# Patient Record
Sex: FEMALE | Race: BLACK OR AFRICAN AMERICAN | NOT HISPANIC OR LATINO | Employment: FULL TIME | URBAN - METROPOLITAN AREA
[De-identification: names, ages, dates, MRNs, and addresses within clinical notes are randomized per-mention and may not be internally consistent; named-entity substitution may affect disease eponyms.]

---

## 2022-11-29 ENCOUNTER — HOSPITAL ENCOUNTER (EMERGENCY)
Facility: CLINIC | Age: 22
Discharge: HOME | End: 2022-11-29
Attending: EMERGENCY MEDICINE | Admitting: EMERGENCY MEDICINE

## 2022-11-29 VITALS
OXYGEN SATURATION: 100 % | SYSTOLIC BLOOD PRESSURE: 123 MMHG | BODY MASS INDEX: 23.9 KG/M2 | WEIGHT: 140 LBS | DIASTOLIC BLOOD PRESSURE: 78 MMHG | RESPIRATION RATE: 18 BRPM | HEIGHT: 64 IN | TEMPERATURE: 98 F | HEART RATE: 93 BPM

## 2022-11-29 DIAGNOSIS — H66.91 RIGHT ACUTE OTITIS MEDIA: ICD-10-CM

## 2022-11-29 PROCEDURE — 99283 EMERGENCY DEPT VISIT LOW MDM: CPT

## 2022-11-29 RX ORDER — AMOXICILLIN 500 MG/1
500 CAPSULE ORAL 3 TIMES DAILY
Qty: 21 CAPSULE | Refills: 0 | Status: SHIPPED | OUTPATIENT
Start: 2022-11-29 | End: 2022-12-06

## 2022-11-29 ASSESSMENT — ENCOUNTER SYMPTOMS
COUGH: 0
CONFUSION: 0
HEADACHES: 0
RHINORRHEA: 0
FEVER: 0
SORE THROAT: 0
EYE PAIN: 0
SINUS PAIN: 0
ADENOPATHY: 0
LIGHT-HEADEDNESS: 0
TROUBLE SWALLOWING: 0
FACIAL SWELLING: 0
FACIAL ASYMMETRY: 0

## 2022-11-30 NOTE — ED PROVIDER NOTES
EMERGENCY DEPARTMENT ENCOUNTER      NAME: Tigre Mondragon  AGE: 22 year old female  YOB: 2000  MRN: [id number]  EVALUATION DATE & TIME: No admission date for patient encounter.    PCP: System, Provider Not In    ED PROVIDER: Kevin Oswald MD      Chief Complaint   Patient presents with     Ear Fullness         FINAL IMPRESSION:  1. Right acute otitis media          ED COURSE & MEDICAL DECISION MAKING:    Pertinent Labs & Imaging studies reviewed. (See chart for details)  22 year old female presents to the Emergency Department for evaluation of right ear discomfort    Differential includes otitis media, eustachian tube dysfunction, cerumen impaction, referred pain    ED Course as of 11/29/22 2250 Tue Nov 29, 2022 2141 Chart review patient was registered at the [facility] earlier today   2249 On exam has some right tympanic membrane redness no bulging.  Otherwise ear exam is normal   2250 Plan for flight tomorrow.  Recommend Sudafed as well as amoxicillin for developing ear infection   2250 Mastoiditis, Melinda Llanos, stroke unlikely     10:29 PM I met with the patient and performed my initial interview and exam and discussed plan for discharge. Patient seen in [facility] due to critical capacity leaving no ED beds available.      At the conclusion of the encounter I discussed the results of all of the tests and the disposition. The questions were answered. The patient or family acknowledged understanding and was agreeable with the care plan.     Medical Decision Making    History:    Supplemental history from: N/A    External Record(s) reviewed: Documented in HPI, if applicable.    Work Up:    Chart documentation includes differential considered and any EKGs or imaging interpreted by provider.    In additional to work up documented, I considered the following work up: See chart documentation, if applicable.    External consultation:    Discussion of management with another provider: See chart  "documentation, if applicable    Complicating factors:    Care impacted by chronic illness: None    Care affected by social determinants of health: N/A    Disposition considerations: Discharge. I prescribed additional prescription strength medication(s) as charted. I did not consider admission.        MEDICATIONS GIVEN IN THE EMERGENCY:  Medications - No data to display    NEW PRESCRIPTIONS STARTED AT TODAY'S ER VISIT  Discharge Medication List as of 11/29/2022 10:39 PM      START taking these medications    Details   amoxicillin (AMOXIL) 500 MG capsule Take 1 capsule (500 mg) by mouth 3 times daily for 7 days, Disp-21 capsule, R-0, Local Print                =================================================================    HPI    Patient information was obtained from: Patient     Use of : N/A       Tigre Mondragon is a 22 year old female with a pertinent history of reactive airway disease who presents to this ED via walk-in for evaluation of ear fullness    The patient reports they began to feel right ear fullness yesterday morning. They state that it \"feels clogged\" and that it feels like their right ear needs to pop. They state that they can still hear, but notes they \"can hear [themselves]\". The patient notes that they use q-tips in their ears. The patient denies fever, teeth pain, sinus pain, ear pain, ear drainage, runny nose, sore throat, sneezing, or any other symptoms or complaints.     REVIEW OF SYSTEMS   Review of Systems   Constitutional: Negative for fever.   HENT: Positive for ear pain. Negative for congestion, dental problem, ear discharge, facial swelling, hearing loss, rhinorrhea, sinus pain, sneezing, sore throat and trouble swallowing.         Right ear fullness   Eyes: Negative for pain.   Respiratory: Negative for cough.    Skin: Negative for rash.   Neurological: Negative for facial asymmetry, light-headedness and headaches.   Hematological: Negative for adenopathy. " "  Psychiatric/Behavioral: Negative for confusion.   All other systems reviewed and are negative.       PAST MEDICAL HISTORY:  No past medical history on file.    PAST SURGICAL HISTORY:  No past surgical history on file.        CURRENT MEDICATIONS:    amoxicillin (AMOXIL) 500 MG capsule        ALLERGIES:  No Known Allergies    FAMILY HISTORY:  No family history on file.    SOCIAL HISTORY:   Social History     Socioeconomic History     Marital status: Single       VITALS:  /78   Pulse 93   Temp 98  F (36.7  C) (Temporal)   Resp 18   Ht 1.626 m (5' 4\")   Wt 63.5 kg (140 lb)   SpO2 100%   BMI 24.03 kg/m      PHYSICAL EXAM    Vitals: /78   Pulse 93   Temp 98  F (36.7  C) (Temporal)   Resp 18   Ht 1.626 m (5' 4\")   Wt 63.5 kg (140 lb)   SpO2 100%   BMI 24.03 kg/m    General: Appears in no acute distress, awake, alert, interactive.  Eyes: Conjunctivae non-injected. Sclera anicteric.  HENT: Atraumatic.  Right TM has some mild redness.  No vesicles.  No canal swelling.  No mastoid tenderness  Neck: Supple.  Respiratory/Chest: Respiration unlabored.  Abdomen: non distended  Musculoskeletal: Normal extremities. No edema or erythema.  Skin: Normal color. No rash or diaphoresis.  Neurologic: Face symmetric, moves all extremities spontaneously. Speech clear.  Psychiatric: Oriented to person, place, and time. Affect appropriate.         LAB:  All pertinent labs reviewed and interpreted.       RADIOLOGY:  Reviewed all pertinent imaging. Please see official radiology report.  No orders to display                    I, Shelby oRsario, am serving as a scribe to document services personally performed by Kevin Oswald MD based on my observation and the provider's statements to me. I, Kevin Oswald MD, attest that Shelby Rosario is acting in a scribe capacity, has observed my performance of the services and has documented them in accordance with my direction.    Kevin Oswald MD  [facility]" [facility] EMERGENCY ROOM  [facility] [facility] [id number]  [phone]     Kevin Oswald MD  11/29/22 [id number]

## 2022-11-30 NOTE — DISCHARGE INSTRUCTIONS
As we discussed recommend buy over-the-counter Sudafed and make sure using it especially before your flight tomorrow.  Recommend amoxicillin 3 times a day for 7 days.  If no improvement follow-up with your primary care doctor.  Avoid using Q-tips.

## 2022-11-30 NOTE — ED TRIAGE NOTES
Pt presents to the ED with c/o right ear fullness. Started yesterday around 1700. Denies any pain or fevers.